# Patient Record
Sex: FEMALE
[De-identification: names, ages, dates, MRNs, and addresses within clinical notes are randomized per-mention and may not be internally consistent; named-entity substitution may affect disease eponyms.]

---

## 2017-08-25 ENCOUNTER — HOSPITAL ENCOUNTER (EMERGENCY)
Dept: HOSPITAL 14 - H.ER | Age: 47
Discharge: HOME | End: 2017-08-25
Payer: SELF-PAY

## 2017-08-25 VITALS
OXYGEN SATURATION: 100 % | DIASTOLIC BLOOD PRESSURE: 84 MMHG | TEMPERATURE: 98.2 F | HEART RATE: 70 BPM | SYSTOLIC BLOOD PRESSURE: 132 MMHG

## 2017-08-25 VITALS — BODY MASS INDEX: 38.5 KG/M2

## 2017-08-25 VITALS — RESPIRATION RATE: 20 BRPM

## 2017-08-25 DIAGNOSIS — E78.00: ICD-10-CM

## 2017-08-25 DIAGNOSIS — Z95.0: ICD-10-CM

## 2017-08-25 DIAGNOSIS — R07.89: Primary | ICD-10-CM

## 2017-08-25 DIAGNOSIS — R00.1: ICD-10-CM

## 2017-08-25 DIAGNOSIS — E03.9: ICD-10-CM

## 2017-08-25 LAB
BASOPHILS # BLD AUTO: 0 K/UL (ref 0–0.2)
BASOPHILS NFR BLD: 0.4 % (ref 0–2)
BUN SERPL-MCNC: 17 MG/DL (ref 7–17)
CALCIUM SERPL-MCNC: 9.2 MG/DL (ref 8.4–10.2)
CHLORIDE SERPL-SCNC: 106 MMOL/L (ref 98–107)
CO2 SERPL-SCNC: 21 MMOL/L (ref 22–30)
EOSINOPHIL # BLD AUTO: 0.1 K/UL (ref 0–0.7)
EOSINOPHIL NFR BLD: 1 % (ref 0–4)
ERYTHROCYTE [DISTWIDTH] IN BLOOD BY AUTOMATED COUNT: 13.1 % (ref 11.5–14.5)
GLUCOSE SERPL-MCNC: 87 MG/DL (ref 65–105)
HCT VFR BLD CALC: 36.5 % (ref 34–47)
LYMPHOCYTES # BLD AUTO: 5.2 K/UL (ref 1–4.3)
LYMPHOCYTES NFR BLD AUTO: 47.2 % (ref 20–40)
MCH RBC QN AUTO: 32.5 PG (ref 27–31)
MCHC RBC AUTO-ENTMCNC: 34.1 G/DL (ref 33–37)
MCV RBC AUTO: 95.4 FL (ref 81–99)
MONOCYTES # BLD: 0.5 K/UL (ref 0–0.8)
MONOCYTES NFR BLD: 5 % (ref 0–10)
NEUTROPHILS # BLD: 5.1 K/UL (ref 1.8–7)
NEUTROPHILS NFR BLD AUTO: 46.4 % (ref 50–75)
NRBC BLD AUTO-RTO: 0.2 % (ref 0–0)
PLATELET # BLD: 234 K/UL (ref 130–400)
PMV BLD AUTO: 9 FL (ref 7.2–11.7)
POTASSIUM SERPL-SCNC: 4.2 MMOL/L (ref 3.6–5)
SODIUM SERPL-SCNC: 138 MMOL/L (ref 132–148)
WBC # BLD AUTO: 11 K/UL (ref 4.8–10.8)
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
## 2017-08-26 NOTE — RAD
PROCEDURE:  CHEST RADIOGRAPH, 1 VIEW



HISTORY:

Chest pain 



COMPARISON:

02/10/2016.



FINDINGS:



LUNGS:

The lungs are well inflated and clear.



PLEURA:

No pneumothorax or pleural fluid seen.



CARDIOVASCULAR:

The heart is normal in size.  There is stable position of a 

left-sided unipolar transvenous permanent pacing device.



OSSEOUS STRUCTURES:

No significant abnormalities.



VISUALIZED UPPER ABDOMEN:

Normal.



OTHER FINDINGS:

None. 



IMPRESSION:

No acute findings.

## 2017-08-28 NOTE — CARD
--------------- APPROVED REPORT --------------





EKG Measurement

Heart [id number]

[id number]

[id number]

[id number]

[id number]



<Conclusion>

Atrial-paced rhythm

T wave abnormality, consider anterior ischemia

Prolonged QT

Abnormal ECG

## 2018-03-19 ENCOUNTER — HOSPITAL ENCOUNTER (EMERGENCY)
Dept: HOSPITAL 14 - H.ER | Age: 48
Discharge: HOME | End: 2018-03-19
Payer: SELF-PAY

## 2018-03-19 VITALS
DIASTOLIC BLOOD PRESSURE: 67 MMHG | SYSTOLIC BLOOD PRESSURE: 123 MMHG | HEART RATE: 76 BPM | RESPIRATION RATE: 18 BRPM | TEMPERATURE: 97.8 F

## 2018-03-19 VITALS — BODY MASS INDEX: 38.5 KG/M2
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
## 2018-03-19 DIAGNOSIS — Z79.82: ICD-10-CM

## 2018-03-19 DIAGNOSIS — S06.0X0A: Primary | ICD-10-CM

## 2018-03-19 DIAGNOSIS — I10: ICD-10-CM

## 2018-03-19 DIAGNOSIS — Z95.0: ICD-10-CM

## 2018-03-19 DIAGNOSIS — E03.9: ICD-10-CM
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
## 2018-03-19 DIAGNOSIS — E78.00: ICD-10-CM
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
## 2018-03-19 NOTE — CT
PROCEDURE:  CT scan of the brain dated 03/19/2018. 



HISTORY:

Head injury 



COMPARISON:

None available. 



TECHNIQUE:

Axial computed tomography images were obtained through the head/brain 

without intravenous contrast.  



Radiation dose:



Total exam DLP = 926.62 mGy-cm.



This CT exam was performed using one or more of the following dose 

reduction techniques: Automated exposure control, adjustment of the 

mA and/or kV according to patient size, and/or use of iterative 

reconstruction technique.



FINDINGS:



HEMORRHAGE:

No acute parenchymal, subarachnoid or extra-axial hemorrhage. 



BRAIN:

No mass effect or edema.  No atrophy or chronic microvascular 

ischemic changes.



VENTRICLES:

Unremarkable. No hydrocephalus. 



CALVARIUM:

No acute calvarial



PARANASAL SINUSES:

Unremarkable as visualized. No significant inflammatory changes. 

Fractures 



Follow-up old on healed nasal bone fracture deformities are felt to 

be present. 



MASTOID AIR CELLS:

Unremarkable as visualized. No inflammatory changes.



OTHER FINDINGS:

None.



IMPRESSION:

No acute intracranial hemorrhage.

## 2018-03-21 VITALS — OXYGEN SATURATION: 97 %
